# Patient Record
Sex: MALE | Race: WHITE | NOT HISPANIC OR LATINO | URBAN - METROPOLITAN AREA
[De-identification: names, ages, dates, MRNs, and addresses within clinical notes are randomized per-mention and may not be internally consistent; named-entity substitution may affect disease eponyms.]

---

## 2017-02-16 ENCOUNTER — HOSPITAL ENCOUNTER (OUTPATIENT)
Dept: GENERAL RADIOLOGY | Facility: HOSPITAL | Age: 42
Discharge: HOME | End: 2017-02-16
Admitting: INTERNAL MEDICINE

## 2017-02-16 ENCOUNTER — OFFICE VISIT (OUTPATIENT)
Dept: FAMILY MEDICINE CLINIC | Facility: CLINIC | Age: 42
End: 2017-02-16

## 2017-02-16 VITALS
RESPIRATION RATE: 16 BRPM | OXYGEN SATURATION: 99 % | WEIGHT: 191.3 LBS | TEMPERATURE: 97.8 F | HEIGHT: 74 IN | DIASTOLIC BLOOD PRESSURE: 82 MMHG | BODY MASS INDEX: 24.55 KG/M2 | SYSTOLIC BLOOD PRESSURE: 130 MMHG | HEART RATE: 65 BPM

## 2017-02-16 DIAGNOSIS — Z00.00 HEALTH CARE MAINTENANCE: Primary | ICD-10-CM

## 2017-02-16 DIAGNOSIS — G25.81 RESTLESS LEG: ICD-10-CM

## 2017-02-16 DIAGNOSIS — F51.01 PRIMARY INSOMNIA: ICD-10-CM

## 2017-02-16 DIAGNOSIS — R22.1 NECK MASS: ICD-10-CM

## 2017-02-16 DIAGNOSIS — M54.2 NECK PAIN: ICD-10-CM

## 2017-02-16 PROBLEM — A60.00 GENITAL HERPES: Status: ACTIVE | Noted: 2017-02-16

## 2017-02-16 PROBLEM — J45.909 AIRWAY HYPERREACTIVITY: Status: ACTIVE | Noted: 2017-02-16

## 2017-02-16 PROBLEM — G47.00 CANNOT SLEEP: Status: ACTIVE | Noted: 2017-02-16

## 2017-02-16 PROBLEM — F52.21 ED (ERECTILE DYSFUNCTION) OF NON-ORGANIC ORIGIN: Status: ACTIVE | Noted: 2017-02-16

## 2017-02-16 PROCEDURE — 72040 X-RAY EXAM NECK SPINE 2-3 VW: CPT

## 2017-02-16 PROCEDURE — 99396 PREV VISIT EST AGE 40-64: CPT | Performed by: INTERNAL MEDICINE

## 2017-02-16 RX ORDER — TIZANIDINE 4 MG/1
4 TABLET ORAL NIGHTLY PRN
Qty: 30 TABLET | Refills: 1 | Status: SHIPPED | OUTPATIENT
Start: 2017-02-16 | End: 2017-05-03

## 2017-02-16 RX ORDER — VALACYCLOVIR HYDROCHLORIDE 500 MG/1
TABLET, FILM COATED ORAL
COMMUNITY
Start: 2013-12-17 | End: 2017-05-03

## 2017-02-16 RX ORDER — SILDENAFIL 50 MG/1
TABLET, FILM COATED ORAL
COMMUNITY
Start: 2013-12-17 | End: 2017-08-23 | Stop reason: SDUPTHER

## 2017-02-16 RX ORDER — NABUMETONE 500 MG/1
500 TABLET, FILM COATED ORAL 2 TIMES DAILY PRN
Qty: 180 TABLET | Refills: 1 | Status: SHIPPED | OUTPATIENT
Start: 2017-02-16 | End: 2017-05-03

## 2017-02-16 RX ORDER — TRAZODONE HYDROCHLORIDE 100 MG/1
200 TABLET ORAL NIGHTLY
Qty: 180 TABLET | Refills: 3 | Status: SHIPPED | OUTPATIENT
Start: 2017-02-16 | End: 2017-11-09 | Stop reason: SDUPTHER

## 2017-02-16 RX ORDER — PRAMIPEXOLE DIHYDROCHLORIDE 0.25 MG/1
0.5 TABLET ORAL NIGHTLY PRN
Qty: 90 TABLET | Refills: 3 | Status: SHIPPED | OUTPATIENT
Start: 2017-02-16 | End: 2017-08-08 | Stop reason: SDUPTHER

## 2017-02-16 RX ORDER — TRAZODONE HYDROCHLORIDE 150 MG/1
TABLET ORAL DAILY
COMMUNITY
Start: 2014-03-21 | End: 2017-02-16

## 2017-02-16 RX ORDER — PRAMIPEXOLE DIHYDROCHLORIDE 0.25 MG/1
TABLET ORAL
COMMUNITY
Start: 2014-03-21 | End: 2017-02-16 | Stop reason: SDUPTHER

## 2017-02-16 NOTE — PROGRESS NOTES
Subjective   Patient ID: Augie Johnson is a 41 y.o. male presents with   Chief Complaint   Patient presents with   • Med Refill     Former Maggie Patient 90 day refills   • Neck Pain     no injury known       HPI - this patient presents today for yearly physical.    He has a past history of chronic neck pain that seems like it's getting worse it's been going on for a few years he's tried all kinds stretches and massage is in some mild nonsteroidal anti-inflammatories that don't help.  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  He's had no trauma to the neck in the past.  No radiation of pain and numbness tingling or weakness going down into his arms.  He describes it as a severe ache causing headaches sometimes.  He also has restless leg syndrome insomnia.  He also feels a small mass on the right side of his neck at the base of his skull it's not grown over the last year it's not really that tender.    Assessment plan    Health care maintenance-recommend healthy diet and routine exercise.  We'll get some routine labs    Neck mass/neck pain-we will send to Gen. surgery and see if we can order an MRI of the neck.  Start Relafen 500 by mouth twice a day when necessary neck pain warned of potential side effects he agreed.    Insomnia refilled his trazodone 1-200 mg at bedtime    Restless leg syndrome 0.25 one to 2 by mouth daily at bedtime   Allergies   Allergen Reactions   • Penicillins    • Sulfa Antibiotics        The following portions of the patient's history were reviewed and updated as appropriate: allergies, current medications, past family history, past medical history, past social history, past surgical history and problem list.      Review of Systems   Constitutional: Negative.    HENT: Negative.    Eyes: Negative.    Respiratory: Negative.    Cardiovascular: Negative.    Gastrointestinal: Negative.         Constipation predominant irritable bowel.  Improved with fiber  "supplementation   Endocrine: Negative.    Genitourinary: Negative.    Musculoskeletal: Positive for myalgias, neck pain and neck stiffness.   Skin: Negative.    Allergic/Immunologic: Negative.    Neurological: Negative.    Hematological: Negative.    Psychiatric/Behavioral: Positive for sleep disturbance.       Objective     Vitals:    02/16/17 1033   BP: 130/82   Pulse: 65   Resp: 16   Temp: 97.8 °F (36.6 °C)   TempSrc: Oral   SpO2: 99%   Weight: 191 lb 4.8 oz (86.8 kg)   Height: 74\" (188 cm)         Physical Exam   Constitutional: He is oriented to person, place, and time. He appears well-developed and well-nourished. No distress.   HENT:   Head: Normocephalic and atraumatic.   Eyes: Conjunctivae and EOM are normal. Pupils are equal, round, and reactive to light. Right eye exhibits no discharge. Left eye exhibits no discharge. No scleral icterus.   Neck: Normal range of motion. Neck supple. No tracheal deviation present. No thyromegaly present.   Cardiovascular: Normal rate, regular rhythm, normal heart sounds and normal pulses.  Exam reveals no gallop.    No murmur heard.  Pulmonary/Chest: Effort normal and breath sounds normal. No respiratory distress. He has no wheezes. He has no rales.   Abdominal: Soft. There is no tenderness.   Musculoskeletal: Normal range of motion.   Neurological: He is alert and oriented to person, place, and time. He exhibits normal muscle tone. Coordination normal.   Skin: Skin is warm. No rash noted. No erythema. No pallor.   Psychiatric: He has a normal mood and affect. His behavior is normal. Judgment and thought content normal.   Nursing note and vitals reviewed.        Augie was seen today for med refill and neck pain.    Diagnoses and all orders for this visit:    Health care maintenance  -     Comprehensive Metabolic Panel  -     TSH+Free T4  -     CBC & Differential  -     XR Spine Cervical 2 or 3 View    Neck pain  -     Comprehensive Metabolic Panel  -     TSH+Free T4  -     " CBC & Differential  -     Ambulatory Referral to General Surgery  -     XR Spine Cervical 2 or 3 View    Neck mass  -     Comprehensive Metabolic Panel  -     TSH+Free T4  -     CBC & Differential  -     Ambulatory Referral to General Surgery  -     XR Spine Cervical 2 or 3 View  -     MRI Cervical Spine Without Contrast    Primary insomnia    Restless leg    Other orders  -     traZODone (DESYREL) 100 MG tablet; Take 2 tablets by mouth Every Night.  -     pramipexole (MIRAPEX) 0.25 MG tablet; Take 2 tablets by mouth At Night As Needed (restless legs).  -     nabumetone (RELAFEN) 500 MG tablet; Take 1 tablet by mouth 2 (Two) Times a Day As Needed for mild pain (1-3).  -     tiZANidine (ZANAFLEX) 4 MG tablet; Take 1 tablet by mouth At Night As Needed (neck pain).        Call or return to clinic prn if these symptoms worsen or fail to improve as anticipated.

## 2017-02-22 ENCOUNTER — HOSPITAL ENCOUNTER (OUTPATIENT)
Dept: MRI IMAGING | Facility: HOSPITAL | Age: 42
Discharge: HOME | End: 2017-02-22
Admitting: INTERNAL MEDICINE

## 2017-02-22 PROCEDURE — 72141 MRI NECK SPINE W/O DYE: CPT

## 2017-02-24 DIAGNOSIS — M48.02 CERVICAL SPINAL STENOSIS: Primary | ICD-10-CM

## 2017-02-27 ENCOUNTER — TELEPHONE (OUTPATIENT)
Dept: FAMILY MEDICINE CLINIC | Facility: CLINIC | Age: 42
End: 2017-02-27

## 2017-02-27 NOTE — TELEPHONE ENCOUNTER
He might be able to get in to see the nurse practitioner at the neurosurgeon's office, check to see if he wants to do this

## 2017-02-27 NOTE — TELEPHONE ENCOUNTER
He states that he had a appt with neurosurgeon but, can't get in until May. He wants to know is there anywhere he could get in earlier?

## 2017-03-08 ENCOUNTER — TRANSCRIBE ORDERS (OUTPATIENT)
Dept: ADMINISTRATIVE | Facility: HOSPITAL | Age: 42
End: 2017-03-08

## 2017-03-08 DIAGNOSIS — R22.1 NECK MASS: Primary | ICD-10-CM

## 2017-03-10 ENCOUNTER — TELEPHONE (OUTPATIENT)
Dept: FAMILY MEDICINE CLINIC | Facility: CLINIC | Age: 42
End: 2017-03-10

## 2017-03-10 NOTE — TELEPHONE ENCOUNTER
Becca, doesn't look like this was done when he was in for visit. It also looks as if he didn't schedule to come back for them. This is in your overdue task. thanks

## 2017-03-13 ENCOUNTER — HOSPITAL ENCOUNTER (OUTPATIENT)
Dept: CT IMAGING | Facility: HOSPITAL | Age: 42
Discharge: HOME | End: 2017-03-13
Attending: SURGERY | Admitting: SURGERY

## 2017-03-13 DIAGNOSIS — R22.1 NECK MASS: ICD-10-CM

## 2017-03-13 PROCEDURE — 70491 CT SOFT TISSUE NECK W/DYE: CPT

## 2017-03-13 PROCEDURE — 0 IOPAMIDOL 61 % SOLUTION: Performed by: SURGERY

## 2017-03-13 RX ADMIN — IOPAMIDOL 75 ML: 612 INJECTION, SOLUTION INTRAVENOUS at 14:00

## 2017-04-13 ENCOUNTER — OFFICE VISIT (OUTPATIENT)
Dept: NEUROSURGERY | Facility: CLINIC | Age: 42
End: 2017-04-13

## 2017-04-13 VITALS
SYSTOLIC BLOOD PRESSURE: 116 MMHG | WEIGHT: 188 LBS | HEART RATE: 73 BPM | HEIGHT: 74 IN | BODY MASS INDEX: 24.13 KG/M2 | DIASTOLIC BLOOD PRESSURE: 75 MMHG

## 2017-04-13 DIAGNOSIS — M54.2 NECK PAIN: Primary | ICD-10-CM

## 2017-04-13 PROCEDURE — 99203 OFFICE O/P NEW LOW 30 MIN: CPT | Performed by: NEUROLOGICAL SURGERY

## 2017-04-13 RX ORDER — TRIAMCINOLONE ACETONIDE 1 MG/G
CREAM TOPICAL
Refills: 0 | COMMUNITY
Start: 2017-02-08

## 2017-04-13 NOTE — PROGRESS NOTES
Subjective   Patient ID: Augie Johnson is a 41 y.o. male is being seen for consultation today at the request of Self Referring neck pain.    Neurologic Problem   The patient's primary symptoms include a visual change. The patient's pertinent negatives include no altered mental status, clumsiness, focal sensory loss, focal weakness, loss of balance, memory loss, near-syncope, slurred speech, syncope or weakness. This is a chronic problem. The current episode started more than 1 year ago. The neurological problem developed gradually. The problem is unchanged. Associated symptoms include headaches and neck pain. Pertinent negatives include no abdominal pain, auditory change, aura, back pain, bladder incontinence, bowel incontinence, chest pain, confusion, diaphoresis, dizziness, fatigue, fever, light-headedness, nausea, palpitations, shortness of breath, vertigo or vomiting. Past treatments include acetaminophen, bed rest, medication, neck support and position change. The treatment provided no relief.       This patient has been having pain in his neck particularly on the right side for several years.  The pain is dull and aching.  It is not severe but it is there all the time.  No matter what he does it does not go away.  There is no radiation into his arms and no difficulty with bowel or bladder control or other associated symptoms.  He has been treated so far with ergonomic changes which have helped a little bit because he does feel that bending his head forward makes the pain worse.  He has had no other treatment however.    The following portions of the patient's history were reviewed and updated as appropriate: allergies, current medications, past family history, past medical history, past social history, past surgical history and problem list.    Review of Systems   Constitutional: Positive for activity change. Negative for diaphoresis, fatigue and fever.   HENT: Positive for tinnitus.    Eyes: Positive for  discharge and itching.   Respiratory: Negative for chest tightness and shortness of breath.    Cardiovascular: Negative for chest pain, palpitations and near-syncope.   Gastrointestinal: Negative for abdominal pain, bowel incontinence, nausea and vomiting.   Genitourinary: Negative for bladder incontinence.   Musculoskeletal: Positive for neck pain and neck stiffness. Negative for back pain.   Neurological: Positive for headaches. Negative for dizziness, vertigo, focal weakness, syncope, weakness, light-headedness and loss of balance.   Psychiatric/Behavioral: Negative for confusion and memory loss.   All other systems reviewed and are negative.      Objective   Physical Exam   Constitutional: He is oriented to person, place, and time. He appears well-developed and well-nourished.   HENT:   Head: Normocephalic and atraumatic.   Eyes: Conjunctivae and EOM are normal. Pupils are equal, round, and reactive to light.   Fundoscopic exam:       The right eye shows no papilledema. The right eye shows venous pulsations.        The left eye shows no papilledema. The left eye shows venous pulsations.   Neck: Carotid bruit is not present.   Neurological: He is oriented to person, place, and time. He has a normal Finger-Nose-Finger Test and a normal Heel to Shin Test. Gait normal.   Reflex Scores:       Tricep reflexes are 2+ on the right side and 2+ on the left side.       Bicep reflexes are 2+ on the right side and 2+ on the left side.       Brachioradialis reflexes are 2+ on the right side and 2+ on the left side.       Patellar reflexes are 2+ on the right side and 2+ on the left side.       Achilles reflexes are 2+ on the right side and 2+ on the left side.  Psychiatric: His speech is normal.     Neurologic Exam     Mental Status   Oriented to person, place, and time.   Registration of memory: Good recent and remote memory.   Attention: normal. Concentration: normal.   Speech: speech is normal   Level of consciousness:  alert  Knowledge: consistent with education.     Cranial Nerves     CN II   Visual fields full to confrontation.   Visual acuity: normal    CN III, IV, VI   Pupils are equal, round, and reactive to light.  Extraocular motions are normal.     CN V   Facial sensation intact.   Right corneal reflex: normal  Left corneal reflex: normal    CN VII   Facial expression full, symmetric.   Right facial weakness: none  Left facial weakness: none    CN VIII   Hearing: intact    CN IX, X   Palate: symmetric    CN XI   Right sternocleidomastoid strength: normal  Left sternocleidomastoid strength: normal    CN XII   Tongue: not atrophic  Tongue deviation: none    Motor Exam   Muscle bulk: normal  Right arm tone: normal  Left arm tone: normal  Right leg tone: normal  Left leg tone: normal    Strength   Strength 5/5 except as noted.     Sensory Exam   Light touch normal.     Gait, Coordination, and Reflexes     Gait  Gait: normal    Coordination   Finger to nose coordination: normal  Heel to shin coordination: normal    Reflexes   Right brachioradialis: 2+  Left brachioradialis: 2+  Right biceps: 2+  Left biceps: 2+  Right triceps: 2+  Left triceps: 2+  Right patellar: 2+  Left patellar: 2+  Right achilles: 2+  Left achilles: 2+  Right : 2+  Left : 2+      Assessment/Plan   Independent Review of Radiographic Studies:      Plain films and an MRI of his cervical spine done in February of this year.  The plain films show some degenerative change but nothing severe.  The MRI of the cervical spine looks okay at C2 3.  There may be a little foraminal stenosis on the left at C4 or 5 is open area there is some right-sided disc bulging at C5 6 there is fairly significant causing some distortion of the cord.  There is also some disc bulging at C6 7 causing more significant spinal cord compression.  C7-T1 looks okay.  The patient also had a CT of his that because of the palpable abnormality which did not show any mass.    Medical  Decision Making:      I told the patient about the imaging.  I told him that I don't see anything here that is terribly dangerous and consequently in the absence of a radiculopathy I would tend to hold off on any type of surgery.  He agrees with that and does not feel the pain is bad enough at this point to warrant surgery.  I did suggest that we try some physical therapy and epidural blocks as a next step.  He does wish to proceed with those.  I told him that once he finishes the epidural blocks he can start taking 2-400 mg of ibuprofen every 6 hours for a few weeks just to see if that we'll help keep things at bay.  He will call after the blocks and the therapy and let me know how he is doing.    Augie was seen today for neck pain.    Diagnoses and all orders for this visit:    Neck pain  -     Ambulatory Referral to Physical Therapy  -     Epidural Block    Return for Recheck and call after treatment or consultation.

## 2017-05-03 ENCOUNTER — TRANSCRIBE ORDERS (OUTPATIENT)
Dept: PAIN MEDICINE | Facility: HOSPITAL | Age: 42
End: 2017-05-03

## 2017-05-03 ENCOUNTER — HOSPITAL ENCOUNTER (OUTPATIENT)
Dept: PAIN MEDICINE | Facility: HOSPITAL | Age: 42
Discharge: HOME | End: 2017-05-03
Attending: NEUROLOGICAL SURGERY | Admitting: NEUROLOGICAL SURGERY

## 2017-05-03 ENCOUNTER — HOSPITAL ENCOUNTER (OUTPATIENT)
Dept: GENERAL RADIOLOGY | Facility: HOSPITAL | Age: 42
Discharge: HOME | End: 2017-05-03

## 2017-05-03 ENCOUNTER — ANESTHESIA (OUTPATIENT)
Dept: PAIN MEDICINE | Facility: HOSPITAL | Age: 42
End: 2017-05-03

## 2017-05-03 ENCOUNTER — ANESTHESIA EVENT (OUTPATIENT)
Dept: PAIN MEDICINE | Facility: HOSPITAL | Age: 42
End: 2017-05-03

## 2017-05-03 VITALS
WEIGHT: 180 LBS | DIASTOLIC BLOOD PRESSURE: 90 MMHG | RESPIRATION RATE: 16 BRPM | HEART RATE: 62 BPM | TEMPERATURE: 97.6 F | OXYGEN SATURATION: 100 % | HEIGHT: 74 IN | SYSTOLIC BLOOD PRESSURE: 122 MMHG | BODY MASS INDEX: 23.1 KG/M2

## 2017-05-03 DIAGNOSIS — M54.2 NECK PAIN: Primary | ICD-10-CM

## 2017-05-03 DIAGNOSIS — M54.2 NECK PAIN: ICD-10-CM

## 2017-05-03 DIAGNOSIS — R52 PAIN: ICD-10-CM

## 2017-05-03 PROCEDURE — 25010000002 METHYLPREDNISOLONE PER 80 MG: Performed by: ANESTHESIOLOGY

## 2017-05-03 PROCEDURE — C1755 CATHETER, INTRASPINAL: HCPCS

## 2017-05-03 PROCEDURE — 77003 FLUOROGUIDE FOR SPINE INJECT: CPT

## 2017-05-03 RX ORDER — METHYLPREDNISOLONE ACETATE 80 MG/ML
80 INJECTION, SUSPENSION INTRA-ARTICULAR; INTRALESIONAL; INTRAMUSCULAR; SOFT TISSUE ONCE
Status: COMPLETED | OUTPATIENT
Start: 2017-05-03 | End: 2017-05-03

## 2017-05-03 RX ORDER — LIDOCAINE HYDROCHLORIDE 10 MG/ML
1 INJECTION, SOLUTION INFILTRATION; PERINEURAL ONCE
Status: DISCONTINUED | OUTPATIENT
Start: 2017-05-03 | End: 2017-05-04 | Stop reason: HOSPADM

## 2017-05-03 RX ADMIN — METHYLPREDNISOLONE ACETATE 80 MG: 80 INJECTION, SUSPENSION INTRA-ARTICULAR; INTRALESIONAL; INTRAMUSCULAR; SOFT TISSUE at 09:14

## 2017-05-24 ENCOUNTER — TELEPHONE (OUTPATIENT)
Dept: NEUROSURGERY | Facility: CLINIC | Age: 42
End: 2017-05-24

## 2017-05-31 ENCOUNTER — HOSPITAL ENCOUNTER (OUTPATIENT)
Dept: GENERAL RADIOLOGY | Facility: HOSPITAL | Age: 42
Discharge: HOME | End: 2017-05-31

## 2017-05-31 ENCOUNTER — ANESTHESIA EVENT (OUTPATIENT)
Dept: PAIN MEDICINE | Facility: HOSPITAL | Age: 42
End: 2017-05-31

## 2017-05-31 ENCOUNTER — ANESTHESIA (OUTPATIENT)
Dept: PAIN MEDICINE | Facility: HOSPITAL | Age: 42
End: 2017-05-31

## 2017-05-31 ENCOUNTER — HOSPITAL ENCOUNTER (OUTPATIENT)
Dept: PAIN MEDICINE | Facility: HOSPITAL | Age: 42
Discharge: HOME | End: 2017-05-31
Admitting: NEUROLOGICAL SURGERY

## 2017-05-31 VITALS
OXYGEN SATURATION: 97 % | SYSTOLIC BLOOD PRESSURE: 127 MMHG | TEMPERATURE: 97.9 F | DIASTOLIC BLOOD PRESSURE: 90 MMHG | RESPIRATION RATE: 16 BRPM | HEART RATE: 72 BPM

## 2017-05-31 DIAGNOSIS — R52 PAIN: ICD-10-CM

## 2017-05-31 DIAGNOSIS — M54.2 NECK PAIN: ICD-10-CM

## 2017-05-31 PROCEDURE — C1755 CATHETER, INTRASPINAL: HCPCS

## 2017-05-31 PROCEDURE — 77003 FLUOROGUIDE FOR SPINE INJECT: CPT

## 2017-05-31 PROCEDURE — 25010000002 METHYLPREDNISOLONE PER 80 MG: Performed by: ANESTHESIOLOGY

## 2017-05-31 RX ORDER — SODIUM CHLORIDE 0.9 % (FLUSH) 0.9 %
1-10 SYRINGE (ML) INJECTION AS NEEDED
Status: DISCONTINUED | OUTPATIENT
Start: 2017-05-31 | End: 2017-06-01 | Stop reason: HOSPADM

## 2017-05-31 RX ORDER — FENTANYL CITRATE 50 UG/ML
50 INJECTION, SOLUTION INTRAMUSCULAR; INTRAVENOUS
Status: DISCONTINUED | OUTPATIENT
Start: 2017-05-31 | End: 2017-06-01 | Stop reason: HOSPADM

## 2017-05-31 RX ORDER — METHYLPREDNISOLONE ACETATE 80 MG/ML
80 INJECTION, SUSPENSION INTRA-ARTICULAR; INTRALESIONAL; INTRAMUSCULAR; SOFT TISSUE ONCE
Status: COMPLETED | OUTPATIENT
Start: 2017-05-31 | End: 2017-05-31

## 2017-05-31 RX ORDER — MIDAZOLAM HYDROCHLORIDE 1 MG/ML
1 INJECTION INTRAMUSCULAR; INTRAVENOUS
Status: DISCONTINUED | OUTPATIENT
Start: 2017-05-31 | End: 2017-06-01 | Stop reason: HOSPADM

## 2017-05-31 RX ORDER — LIDOCAINE HYDROCHLORIDE 10 MG/ML
1 INJECTION, SOLUTION INFILTRATION; PERINEURAL ONCE
Status: DISCONTINUED | OUTPATIENT
Start: 2017-05-31 | End: 2017-06-01 | Stop reason: HOSPADM

## 2017-05-31 RX ADMIN — METHYLPREDNISOLONE ACETATE 80 MG: 80 INJECTION, SUSPENSION INTRA-ARTICULAR; INTRALESIONAL; INTRAMUSCULAR; SOFT TISSUE at 13:09

## 2017-06-28 ENCOUNTER — APPOINTMENT (OUTPATIENT)
Dept: PAIN MEDICINE | Facility: HOSPITAL | Age: 42
End: 2017-06-28

## 2017-08-08 RX ORDER — PRAMIPEXOLE DIHYDROCHLORIDE 0.25 MG/1
TABLET ORAL
Qty: 90 TABLET | Refills: 3 | Status: SHIPPED | OUTPATIENT
Start: 2017-08-08 | End: 2018-01-31 | Stop reason: SDUPTHER

## 2017-08-24 RX ORDER — SILDENAFIL CITRATE 50 MG
TABLET ORAL
Qty: 9 TABLET | Refills: 0 | Status: SHIPPED | OUTPATIENT
Start: 2017-08-24 | End: 2018-11-12 | Stop reason: SDUPTHER

## 2017-11-07 ENCOUNTER — TELEPHONE (OUTPATIENT)
Dept: FAMILY MEDICINE CLINIC | Facility: CLINIC | Age: 42
End: 2017-11-07

## 2017-11-07 NOTE — TELEPHONE ENCOUNTER
Ed states no one is answering the appt line so, he wants to know if you will call in a z pack for his chest cold?

## 2017-11-09 ENCOUNTER — OFFICE VISIT (OUTPATIENT)
Dept: FAMILY MEDICINE CLINIC | Facility: CLINIC | Age: 42
End: 2017-11-09

## 2017-11-09 VITALS
DIASTOLIC BLOOD PRESSURE: 80 MMHG | RESPIRATION RATE: 16 BRPM | HEIGHT: 74 IN | HEART RATE: 76 BPM | OXYGEN SATURATION: 97 % | WEIGHT: 179 LBS | SYSTOLIC BLOOD PRESSURE: 120 MMHG | BODY MASS INDEX: 22.97 KG/M2 | TEMPERATURE: 98 F

## 2017-11-09 DIAGNOSIS — J45.40 MODERATE PERSISTENT ASTHMA WITHOUT COMPLICATION: Primary | ICD-10-CM

## 2017-11-09 PROCEDURE — 99213 OFFICE O/P EST LOW 20 MIN: CPT | Performed by: INTERNAL MEDICINE

## 2017-11-09 RX ORDER — METHYLPREDNISOLONE 4 MG/1
TABLET ORAL
Qty: 21 TABLET | Refills: 0 | Status: SHIPPED | OUTPATIENT
Start: 2017-11-09

## 2017-11-09 RX ORDER — TRAZODONE HYDROCHLORIDE 100 MG/1
300 TABLET ORAL NIGHTLY
Qty: 270 TABLET | Refills: 3 | Status: SHIPPED | OUTPATIENT
Start: 2017-11-09 | End: 2018-03-14 | Stop reason: SDUPTHER

## 2017-11-09 RX ORDER — ALBUTEROL SULFATE 90 UG/1
2 AEROSOL, METERED RESPIRATORY (INHALATION) EVERY 4 HOURS PRN
Qty: 18 G | Refills: 3 | Status: SHIPPED | OUTPATIENT
Start: 2017-11-09

## 2017-11-09 RX ORDER — BUDESONIDE AND FORMOTEROL FUMARATE DIHYDRATE 160; 4.5 UG/1; UG/1
2 AEROSOL RESPIRATORY (INHALATION)
Qty: 1 INHALER | Refills: 5 | Status: SHIPPED | OUTPATIENT
Start: 2017-11-09

## 2017-11-09 NOTE — PROGRESS NOTES
"Subjective   Patient ID: Augie Johnson is a 42 y.o. male presents with   Chief Complaint   Patient presents with   • URI     sinus congestion       HPI - This patient has had a history of hyperreactive T and asthma as a child.  He only gets shortness of breath when he gets viral respiratory infections.  He had one about 2 weeks ago.  He's feeling better but he's been using his son's nebulizer that helps him with his shortness of breath.  Also has a mild cough no fever or productive cough.    Assessment plan    Moderate persistent asthma-Medrol Dosepak, albuterol rescue inhaler and Symbicort inhaler also recommend the patient get pulmonary function tests at some time.  He'll think about it.  He's to let us know she's not feeling better he agreed.declines xray    Allergies   Allergen Reactions   • Penicillins Other (See Comments)     LOST OF VISION AND REDNESS   • Sulfa Antibiotics Other (See Comments)     POSITIVE ON AN ALLERGY TEST       The following portions of the patient's history were reviewed and updated as appropriate: allergies, current medications, past family history, past medical history, past social history, past surgical history and problem list.      Review of Systems   Constitutional: Negative.    HENT: Positive for congestion.    Respiratory: Positive for cough, shortness of breath and wheezing.    Cardiovascular: Negative for chest pain.       Objective     Vitals:    11/09/17 1453   BP: 120/80   Pulse: 76   Resp: 16   Temp: 98 °F (36.7 °C)   TempSrc: Oral   SpO2: 97%   Weight: 179 lb (81.2 kg)   Height: 74\" (188 cm)         Physical Exam   Constitutional: He appears well-developed and well-nourished.   Cardiovascular: Normal rate, regular rhythm and normal heart sounds.    Pulmonary/Chest: Effort normal and breath sounds normal.   Nursing note and vitals reviewed.        Augie was seen today for uri.    Diagnoses and all orders for this visit:    Moderate persistent asthma without " complication    Other orders  -     MethylPREDNISolone (MEDROL, GILBERTO,) 4 MG tablet; Take as directed on package instructions.  -     albuterol (PROVENTIL HFA;VENTOLIN HFA) 108 (90 Base) MCG/ACT inhaler; Inhale 2 puffs Every 4 (Four) Hours As Needed for Wheezing.  -     traZODone (DESYREL) 100 MG tablet; Take 3 tablets by mouth Every Night.  -     budesonide-formoterol (SYMBICORT) 160-4.5 MCG/ACT inhaler; Inhale 2 puffs 2 (Two) Times a Day.        Call or return to clinic prn if these symptoms worsen or fail to improve as anticipated.

## 2018-01-10 RX ORDER — TRAZODONE HYDROCHLORIDE 100 MG/1
TABLET ORAL
Qty: 180 TABLET | Refills: 3 | Status: SHIPPED | OUTPATIENT
Start: 2018-01-10 | End: 2018-03-14

## 2018-01-26 ENCOUNTER — OFFICE VISIT (OUTPATIENT)
Dept: FAMILY MEDICINE CLINIC | Facility: CLINIC | Age: 43
End: 2018-01-26

## 2018-01-26 VITALS
OXYGEN SATURATION: 97 % | TEMPERATURE: 98.1 F | DIASTOLIC BLOOD PRESSURE: 86 MMHG | HEIGHT: 74 IN | BODY MASS INDEX: 22.97 KG/M2 | SYSTOLIC BLOOD PRESSURE: 121 MMHG | HEART RATE: 69 BPM | WEIGHT: 179 LBS

## 2018-01-26 DIAGNOSIS — S20.212A RIB CONTUSION, LEFT, INITIAL ENCOUNTER: ICD-10-CM

## 2018-01-26 DIAGNOSIS — S00.03XA CONTUSION OF SCALP, INITIAL ENCOUNTER: Primary | ICD-10-CM

## 2018-01-26 DIAGNOSIS — S46.912A STRAIN OF LEFT SHOULDER, INITIAL ENCOUNTER: ICD-10-CM

## 2018-01-26 PROCEDURE — 99213 OFFICE O/P EST LOW 20 MIN: CPT | Performed by: NURSE PRACTITIONER

## 2018-01-26 NOTE — PATIENT INSTRUCTIONS
Discharge instructions    If increased pain redness swelling  Headaches nausea vomiting  Emergency room regarding a right temporal    Follow-up Dr. Tipton in 6 weeks if it is not resolved or nearly resolved    If increased rib pain chest pain shortness of breath abdominal pain  Emergency room    Otherwise your rib tenderness likely subside over the next  4-10 weeks    Thank You,      James Epley,  NP

## 2018-01-26 NOTE — PROGRESS NOTES
Pleasant gentleman here today approximate 3 weeks ago  Slipped and fell on his deck he fell backward and hit his head on the deck surface right side does not believe he lost consciousness  But not positive  He injured his left ribs  He got up shook himself off and had no confusion nausea vomiting no vision loss slurred speech no neck injury  Over the next day he developed swelling after waking up the next morning right temporal region  Over last 3 weeks the swelling has nearly disappeared although he continues to have small area of swelling right temporal  Without pain discomfort,  Nontender to touch, no vision change no headache, no nausea vomiting no confusion  No increased swelling or other problems    He's been asked several times by family members or friends  If he's had this checked out and he is here today to make sure everything is okay    He thinks he may have bruised her injured his ribs on left side      No history DVT or PE  Describes himself as healthy  Nothing makes better or worse    Physical exam  0.5 cm soft induration right temporal adjacent to the right temporal artery  Temporal artery palpable nontender  Swelling the superior adjacent does not appear to have a pulse  Without redness or surrounding edema  Carefully inspected his entire temporal region  Normal without swelling be on a small palpable soft nodule.    No cervical swelling or tenderness  Orders are normal eyes are PERRLA  Neuro exam normal    Left rib approximately seventh and eighth mild tenderness anterior without step-off  No crepitus  Chest normal abdomen normal        Discharge instructions    If increased pain redness swelling  Headaches nausea vomiting  Emergency room regarding a right temporal    Follow-up Dr. Tipton in 6 weeks if it is not resolved or nearly resolved    If increased rib pain chest pain shortness of breath abdominal pain  Emergency room    Otherwise your rib tenderness likely subside over the next  4-10  weeks    Thank You,      James Epley,  NP

## 2018-01-28 PROBLEM — S20.212A RIB CONTUSION, LEFT, INITIAL ENCOUNTER: Status: ACTIVE | Noted: 2018-01-28

## 2018-01-28 PROBLEM — S46.912A STRAIN OF LEFT SHOULDER: Status: ACTIVE | Noted: 2018-01-28

## 2018-01-28 NOTE — PROGRESS NOTES
Subjective   Augie Johnson is a 42 y.o. male.     HPI Comments: Pleasant gentleman here today approximate 3 weeks ago  Slipped and fell on his deck he fell backward and hit his head on the deck surface right side does not believe he lost consciousness  But not positive  He injured his left ribs  He got up shook himself off and had no confusion nausea vomiting no vision loss slurred speech no neck injury  Over the next day he developed swelling after waking up the next morning right temporal region  Over last 3 weeks the swelling has nearly disappeared although he continues to have small area of swelling right temporal  Without pain discomfort,  Nontender to touch, no vision change no headache, no nausea vomiting no confusion  No increased swelling or other problems    He's been asked several times by family members or friends  If he's had this checked out and he is here today to make sure everything is okay    He thinks he may have bruised her injured his ribs on left side      No history DVT or PE  Describes himself as healthy  Nothing makes better or worse          Discharge instructions    If increased pain redness swelling  Headaches nausea vomiting  Emergency room regarding a right temporal    Follow-up Dr. Tipton in 6 weeks if it is not resolved or nearly resolved    If increased rib pain chest pain shortness of breath abdominal pain  Emergency room    Otherwise your rib tenderness likely subside over the next  4-10 weeks    Thank You,      James Epley, NP         The following portions of the patient's history were reviewed and updated as appropriate: allergies, past family history, past medical history, past social history, past surgical history and problem list.    Review of Systems   HENT:        Right temple swelling, greatly improved without pain or tenderness  Still has a very small area swelling   Musculoskeletal:        Left rib bruise   All other systems reviewed and are negative.      Objective    Physical Exam   Constitutional: He is oriented to person, place, and time. He appears well-developed and well-nourished.   HENT:   Head: Normocephalic.       Nose: Nose normal.   Mouth/Throat: Oropharynx is clear and moist.   Tm clear zachery no mass canal patent without d/c    Physical exam  0.5 cm soft induration right temporal adjacent to the right temporal artery  Temporal artery palpable nontender  Swelling the superior adjacent does not appear to have a pulse  Without redness or surrounding edema  Carefully inspected his entire temporal region  Normal without swelling be on a small palpable soft nodule.    No cervical swelling or tenderness  Orders are normal eyes are PERRLA  Neuro exam normal         Eyes: Conjunctivae are normal. Pupils are equal, round, and reactive to light. No scleral icterus.   Neck: Neck supple. No JVD present. No thyromegaly present.   Cardiovascular: Normal rate, regular rhythm and normal heart sounds.  Exam reveals no gallop and no friction rub.    No murmur heard.  Pulmonary/Chest: Effort normal and breath sounds normal. No stridor. No respiratory distress. He has no wheezes. He has no rales.   Abdominal: Soft. Bowel sounds are normal. He exhibits no distension. There is no tenderness.   No hepatosplenomegaly, no ascites,   Musculoskeletal: He exhibits no edema or tenderness.   Mild tenderness left anterior ribs without stop all crepitus or ecchymosis   Lymphadenopathy:     He has no cervical adenopathy.   Neurological: He is alert and oriented to person, place, and time. He has normal reflexes. He displays normal reflexes. No cranial nerve deficit. He exhibits normal muscle tone. Coordination normal.   Skin: Skin is warm and dry. No rash noted. No erythema.   Psychiatric: He has a normal mood and affect. His behavior is normal. Judgment and thought content normal.   Vitals reviewed.      Assessment/Plan   Augie was seen today for fell on ice hit right side of head.    Diagnoses and  all orders for this visit:    Contusion of scalp, initial encounter  Comments:  0.5 cm hematoma versus small superficial venous thrombosis right temporal region    Strain of left shoulder, initial encounter    Rib contusion, left, initial encounter  Comments:  Likely rib contusion, small rib fracture possible as well, improving                Discharge instructions    If increased pain redness swelling  Headaches nausea vomiting  Emergency room regarding     Follow-up Dr. Tipton in 6 weeks if it is not resolved or nearly resolved    If increased rib pain chest pain shortness of breath abdominal pain  Emergency room    Otherwise your rib tenderness likely subside over the next  4-10 weeks    Thank You,      James Epley,  MARIA E

## 2018-01-31 RX ORDER — PRAMIPEXOLE DIHYDROCHLORIDE 0.25 MG/1
TABLET ORAL
Qty: 90 TABLET | Refills: 0 | Status: SHIPPED | OUTPATIENT
Start: 2018-01-31 | End: 2018-03-16 | Stop reason: SDUPTHER

## 2018-03-13 ENCOUNTER — TELEPHONE (OUTPATIENT)
Dept: FAMILY MEDICINE CLINIC | Facility: CLINIC | Age: 43
End: 2018-03-13

## 2018-03-13 NOTE — TELEPHONE ENCOUNTER
Pt called and said last time in he thought you told him to increase his trazadone to 3 a day. So he did. He is running out can you correct the directions and send in?    Or he said did he hear your wrong?    [facility]    This can wait until dr estrella wednesday

## 2018-03-14 RX ORDER — TRAZODONE HYDROCHLORIDE 300 MG/1
300 TABLET ORAL NIGHTLY
Qty: 90 TABLET | Refills: 0 | Status: SHIPPED | OUTPATIENT
Start: 2018-03-14

## 2018-03-16 RX ORDER — PRAMIPEXOLE DIHYDROCHLORIDE 0.25 MG/1
TABLET ORAL
Qty: 90 TABLET | Refills: 0 | Status: SHIPPED | OUTPATIENT
Start: 2018-03-16 | End: 2018-05-12 | Stop reason: SDUPTHER

## 2018-05-14 RX ORDER — PRAMIPEXOLE DIHYDROCHLORIDE 0.25 MG/1
TABLET ORAL
Qty: 90 TABLET | Refills: 0 | Status: SHIPPED | OUTPATIENT
Start: 2018-05-14 | End: 2018-06-09 | Stop reason: SDUPTHER

## 2018-05-23 RX ORDER — SILDENAFIL CITRATE 50 MG
TABLET ORAL
Qty: 9 TABLET | Refills: 0 | OUTPATIENT
Start: 2018-05-23

## 2018-06-11 RX ORDER — PRAMIPEXOLE DIHYDROCHLORIDE 0.25 MG/1
TABLET ORAL
Qty: 90 TABLET | Refills: 0 | Status: SHIPPED | OUTPATIENT
Start: 2018-06-11

## 2018-11-12 RX ORDER — SILDENAFIL 50 MG/1
50 TABLET, FILM COATED ORAL AS NEEDED
Qty: 9 TABLET | Refills: 2 | Status: SHIPPED | OUTPATIENT
Start: 2018-11-12

## 2021-03-13 ENCOUNTER — IMMUNIZATION (OUTPATIENT)
Dept: VACCINE CLINIC | Facility: HOSPITAL | Age: 46
End: 2021-03-13

## 2021-03-13 PROCEDURE — 91300 HC SARSCOV02 VAC 30MCG/0.3ML IM: CPT | Performed by: INTERNAL MEDICINE

## 2021-03-13 PROCEDURE — 0001A: CPT | Performed by: INTERNAL MEDICINE

## 2021-04-03 ENCOUNTER — IMMUNIZATION (OUTPATIENT)
Dept: VACCINE CLINIC | Facility: HOSPITAL | Age: 46
End: 2021-04-03

## 2021-04-03 PROCEDURE — 0002A: CPT | Performed by: INTERNAL MEDICINE

## 2021-04-03 PROCEDURE — 91300 HC SARSCOV02 VAC 30MCG/0.3ML IM: CPT | Performed by: INTERNAL MEDICINE

## 2022-10-01 ENCOUNTER — HOSPITAL ENCOUNTER (EMERGENCY)
Facility: HOSPITAL | Age: 47
Discharge: HOME | End: 2022-10-01
Attending: EMERGENCY MEDICINE | Admitting: EMERGENCY MEDICINE

## 2022-10-01 VITALS
BODY MASS INDEX: 25.15 KG/M2 | HEART RATE: 77 BPM | TEMPERATURE: 98.3 F | WEIGHT: 196 LBS | RESPIRATION RATE: 18 BRPM | HEIGHT: 74 IN | DIASTOLIC BLOOD PRESSURE: 79 MMHG | SYSTOLIC BLOOD PRESSURE: 126 MMHG | OXYGEN SATURATION: 95 %

## 2022-10-01 DIAGNOSIS — S01.81XA CHIN LACERATION, INITIAL ENCOUNTER: Primary | ICD-10-CM

## 2022-10-01 PROCEDURE — 99282 EMERGENCY DEPT VISIT SF MDM: CPT

## 2022-10-01 RX ORDER — GINSENG 100 MG
CAPSULE ORAL ONCE
Status: COMPLETED | OUTPATIENT
Start: 2022-10-01 | End: 2022-10-01

## 2022-10-01 RX ORDER — DOXYCYCLINE 100 MG/1
CAPSULE ORAL
Status: COMPLETED
Start: 2022-10-01 | End: 2022-10-01

## 2022-10-01 RX ORDER — DOXYCYCLINE 100 MG/1
100 CAPSULE ORAL 2 TIMES DAILY
Qty: 14 CAPSULE | Refills: 0 | Status: SHIPPED | OUTPATIENT
Start: 2022-10-01 | End: 2022-10-08

## 2022-10-01 RX ORDER — LIDOCAINE HYDROCHLORIDE AND EPINEPHRINE 10; 10 MG/ML; UG/ML
10 INJECTION, SOLUTION INFILTRATION; PERINEURAL ONCE
Status: DISCONTINUED | OUTPATIENT
Start: 2022-10-01 | End: 2022-10-01

## 2022-10-01 RX ORDER — LIDOCAINE HYDROCHLORIDE 20 MG/ML
10 INJECTION, SOLUTION INFILTRATION; PERINEURAL ONCE
Status: COMPLETED | OUTPATIENT
Start: 2022-10-01 | End: 2022-10-01

## 2022-10-01 RX ORDER — DOXYCYCLINE 100 MG/1
100 CAPSULE ORAL ONCE
Status: CANCELLED | OUTPATIENT
Start: 2022-10-01 | End: 2022-10-01

## 2022-10-01 RX ORDER — BUPIVACAINE HYDROCHLORIDE 5 MG/ML
10 INJECTION, SOLUTION EPIDURAL; INTRACAUDAL ONCE
Status: COMPLETED | OUTPATIENT
Start: 2022-10-01 | End: 2022-10-01

## 2022-10-01 RX ADMIN — BUPIVACAINE HYDROCHLORIDE 10 ML: 5 INJECTION, SOLUTION EPIDURAL; INTRACAUDAL; PERINEURAL at 16:50

## 2022-10-01 RX ADMIN — LIDOCAINE HYDROCHLORIDE 10 ML: 20 INJECTION, SOLUTION INFILTRATION; PERINEURAL at 16:49

## 2022-10-01 RX ADMIN — DOXYCYCLINE 100 MG: 100 CAPSULE ORAL at 18:21

## 2022-10-01 RX ADMIN — Medication: at 18:20

## 2022-10-01 NOTE — DISCHARGE INSTRUCTIONS
Keep wound clean and dry for 24 hours.  Then wash 3 times daily with antibacterial soap, pat dry and apply Neosporin or Bacitracin.  Continue until healed. Follow up with your PCP in 2-3 days for a wound check; in 7 days for suture removal. Sooner for signs of infection.  Return to ED for signs of infection, medical emergencies